# Patient Record
Sex: FEMALE | Race: WHITE
[De-identification: names, ages, dates, MRNs, and addresses within clinical notes are randomized per-mention and may not be internally consistent; named-entity substitution may affect disease eponyms.]

---

## 2022-09-23 ENCOUNTER — HOSPITAL ENCOUNTER (EMERGENCY)
Dept: HOSPITAL 54 - ER | Age: 42
Discharge: HOME | End: 2022-09-23
Payer: COMMERCIAL

## 2022-09-23 VITALS — DIASTOLIC BLOOD PRESSURE: 74 MMHG | SYSTOLIC BLOOD PRESSURE: 109 MMHG

## 2022-09-23 VITALS — HEIGHT: 69 IN | WEIGHT: 160 LBS | BODY MASS INDEX: 23.7 KG/M2

## 2022-09-23 DIAGNOSIS — J18.9: ICD-10-CM

## 2022-09-23 DIAGNOSIS — Z79.899: ICD-10-CM

## 2022-09-23 DIAGNOSIS — R07.89: Primary | ICD-10-CM

## 2022-09-23 LAB
BASOPHILS # BLD AUTO: 0.1 K/UL (ref 0–0.2)
BASOPHILS NFR BLD AUTO: 1 % (ref 0–2)
BUN SERPL-MCNC: 19 MG/DL (ref 7–18)
CALCIUM SERPL-MCNC: 9.1 MG/DL (ref 8.5–10.1)
CHLORIDE SERPL-SCNC: 103 MMOL/L (ref 98–107)
CO2 SERPL-SCNC: 26 MMOL/L (ref 21–32)
CREAT SERPL-MCNC: 1 MG/DL (ref 0.6–1.3)
EOSINOPHIL NFR BLD AUTO: 1.1 % (ref 0–6)
GLUCOSE SERPL-MCNC: 100 MG/DL (ref 74–106)
HCT VFR BLD AUTO: 42 % (ref 33–45)
HGB BLD-MCNC: 13.9 G/DL (ref 11.5–14.8)
LYMPHOCYTES NFR BLD AUTO: 1.4 K/UL (ref 0.8–4.8)
LYMPHOCYTES NFR BLD AUTO: 16.4 % (ref 20–44)
MCHC RBC AUTO-ENTMCNC: 33 G/DL (ref 31–36)
MCV RBC AUTO: 89 FL (ref 82–100)
MONOCYTES NFR BLD AUTO: 0.9 K/UL (ref 0.1–1.3)
MONOCYTES NFR BLD AUTO: 9.9 % (ref 2–12)
NEUTROPHILS # BLD AUTO: 6.3 K/UL (ref 1.8–8.9)
NEUTROPHILS NFR BLD AUTO: 71.6 % (ref 43–81)
PLATELET # BLD AUTO: 273 K/UL (ref 150–450)
POTASSIUM SERPL-SCNC: 4.2 MMOL/L (ref 3.5–5.1)
RBC # BLD AUTO: 4.7 MIL/UL (ref 4–5.2)
SODIUM SERPL-SCNC: 139 MMOL/L (ref 136–145)
WBC NRBC COR # BLD AUTO: 8.7 K/UL (ref 4.3–11)

## 2022-09-23 NOTE — NUR
BIBS C/O CHEST TIGHTNESS THAT WOKE HER FROM SLEEP, UNABLE TO SCAL E PAIN BUT 
INCREASES ON RESPIRATION AND IS CONSTANT. AWAKE AND ALERTX 4 BREATHING EVEN AND 
UNLABORED. PLACED ON CARDIAC MONITOR AND V/S WNL.